# Patient Record
Sex: MALE | Race: WHITE | NOT HISPANIC OR LATINO | Employment: UNEMPLOYED | URBAN - METROPOLITAN AREA
[De-identification: names, ages, dates, MRNs, and addresses within clinical notes are randomized per-mention and may not be internally consistent; named-entity substitution may affect disease eponyms.]

---

## 2023-11-08 ENCOUNTER — OFFICE VISIT (OUTPATIENT)
Dept: NEUROLOGY | Facility: CLINIC | Age: 18
End: 2023-11-08
Payer: COMMERCIAL

## 2023-11-08 VITALS — HEART RATE: 77 BPM | SYSTOLIC BLOOD PRESSURE: 130 MMHG | DIASTOLIC BLOOD PRESSURE: 82 MMHG

## 2023-11-08 DIAGNOSIS — G40.309 GENERALIZED SEIZURE DISORDER (MULTI): Primary | ICD-10-CM

## 2023-11-08 PROCEDURE — 1036F TOBACCO NON-USER: CPT | Performed by: PSYCHIATRY & NEUROLOGY

## 2023-11-08 PROCEDURE — 99203 OFFICE O/P NEW LOW 30 MIN: CPT | Performed by: PSYCHIATRY & NEUROLOGY

## 2023-11-08 RX ORDER — LEVETIRACETAM 750 MG/1
750 TABLET ORAL 2 TIMES DAILY
COMMUNITY
Start: 2023-11-07 | End: 2023-11-14 | Stop reason: SDUPTHER

## 2023-11-08 ASSESSMENT — ENCOUNTER SYMPTOMS: SEIZURES: 1

## 2023-11-08 NOTE — PROGRESS NOTES
Subjective   Kev Mendoza is a 18 y.o.   male.  HPI  This young man with a history of seizures since January, 2023 was referred  By Dr. Patrick Banks, his PCP.  His wife and review of Dr. Banks's notes provides his HPI.  The patient cannot provide any medical history: according to his mother he has a speech delay and tic disorder.  There is no history of closed head injury or craniotomy, no additional neurological problem, such as seizures when growing up.  He has had a total of 4 convulsive seizures.  He has been on Keppra, the dosing increased to 750 mg twice a day.  Two Keppra levels have been <2,  The patient's mother states he has been compliant.  He has not had an EEG, CT images of the head, without contrast, at [facility] did not show any abnormality (3/24/23 and 4/12/23).    He has been diagnosed and treated for a PFO by Cardiology- and the PFO was closed 5/23.  FH: stroke in his maternal grandmother.    Objective   Neurological Exam  Limited neurological examination.  Patient is nearly mute, but able to follow commands.  Normal horizontal eye movements.  Pupils are mid-sized, round, and reactive to light.  VF are full.  Facial muscles are normal.  Moves all 4 extremities well.  Reflexes are 2/4 and symmetric.  Patient is not cooperative for taking off his shoes to perform plantar testing.  Gait is normal.  Normal finger to nose testing bilaterally.    Physical Exam  I personally reviewed laboratory, radiographic, and medical studies which were pertinent for no abnormalities.    Assessment/Plan

## 2023-11-08 NOTE — PATIENT INSTRUCTIONS
Your son has been doing well with control of the seizures.  Continue the current dosing of the Keppra and schedule the EEG: it can be done at [facility].  Follow up in 6 months.

## 2023-11-14 DIAGNOSIS — G40.309 GENERALIZED CONVULSIVE EPILEPSY (MULTI): Primary | ICD-10-CM

## 2023-11-14 RX ORDER — LEVETIRACETAM 750 MG/1
750 TABLET ORAL 2 TIMES DAILY
Qty: 60 TABLET | Refills: 5 | Status: SHIPPED | OUTPATIENT
Start: 2023-11-14 | End: 2023-12-14

## 2023-11-20 ENCOUNTER — TELEPHONE (OUTPATIENT)
Dept: NEUROLOGY | Facility: HOSPITAL | Age: 18
End: 2023-11-20

## 2023-12-05 DIAGNOSIS — G40.309 GENERALIZED CONVULSIVE EPILEPSY (MULTI): Primary | ICD-10-CM

## 2023-12-21 ENCOUNTER — APPOINTMENT (OUTPATIENT)
Dept: NEUROLOGY | Facility: HOSPITAL | Age: 18
End: 2023-12-21
Payer: COMMERCIAL

## 2024-02-07 DIAGNOSIS — G40.909 SEIZURE DISORDER (MULTI): Primary | ICD-10-CM

## 2024-02-28 ENCOUNTER — TELEPHONE (OUTPATIENT)
Dept: NEUROLOGY | Facility: CLINIC | Age: 19
End: 2024-02-28
Payer: COMMERCIAL

## 2024-02-28 ENCOUNTER — DOCUMENTATION (OUTPATIENT)
Dept: NEUROLOGY | Facility: CLINIC | Age: 19
End: 2024-02-28
Payer: COMMERCIAL

## 2024-03-01 DIAGNOSIS — G40.909 SEIZURE DISORDER (MULTI): Primary | ICD-10-CM

## 2024-03-01 RX ORDER — DIVALPROEX SODIUM 125 MG/1
125 TABLET, DELAYED RELEASE ORAL EVERY EVENING
Qty: 30 TABLET | Refills: 2 | Status: SHIPPED | OUTPATIENT
Start: 2024-03-01 | End: 2024-03-31

## 2024-05-07 ENCOUNTER — APPOINTMENT (OUTPATIENT)
Dept: NEUROLOGY | Facility: CLINIC | Age: 19
End: 2024-05-07
Payer: COMMERCIAL

## 2024-06-17 ENCOUNTER — APPOINTMENT (OUTPATIENT)
Dept: NEUROLOGY | Facility: CLINIC | Age: 19
End: 2024-06-17
Payer: COMMERCIAL

## 2024-06-17 VITALS
WEIGHT: 315 LBS | HEART RATE: 76 BPM | HEIGHT: 69 IN | SYSTOLIC BLOOD PRESSURE: 128 MMHG | DIASTOLIC BLOOD PRESSURE: 68 MMHG | BODY MASS INDEX: 46.65 KG/M2

## 2024-06-17 DIAGNOSIS — G40.909 SEIZURE DISORDER (MULTI): ICD-10-CM

## 2024-06-17 DIAGNOSIS — G40.309 GENERALIZED CONVULSIVE EPILEPSY (MULTI): ICD-10-CM

## 2024-06-17 PROCEDURE — 99213 OFFICE O/P EST LOW 20 MIN: CPT | Performed by: PSYCHIATRY & NEUROLOGY

## 2024-06-17 PROCEDURE — 1036F TOBACCO NON-USER: CPT | Performed by: PSYCHIATRY & NEUROLOGY

## 2024-06-17 RX ORDER — LEVETIRACETAM 750 MG/1
750 TABLET ORAL 2 TIMES DAILY
Qty: 60 TABLET | Refills: 5 | Status: SHIPPED | OUTPATIENT
Start: 2024-06-17 | End: 2024-07-17

## 2024-06-17 RX ORDER — GLUCOSAM/CHONDRO/HERB 149/HYAL 750-100 MG
1 TABLET ORAL 2 TIMES DAILY
COMMUNITY

## 2024-06-17 RX ORDER — DIVALPROEX SODIUM 250 MG/1
250 TABLET, DELAYED RELEASE ORAL 2 TIMES DAILY
Qty: 60 TABLET | Refills: 5 | Status: SHIPPED | OUTPATIENT
Start: 2024-06-17 | End: 2024-07-17

## 2024-06-17 NOTE — PROGRESS NOTES
Subjective   Kev Mendoza is a 18 y.o.   male.  HPI  This is a 18 year old man with a history of seizures, since 1/2023.  He was last seen 11/2023.  He last had a seizure about 2 months ago- his mother states he had trouble sleeping.  Generic Depakote 125 mg  at bedtime.  Normal EEG in 2/24, [facility].  He is here with his mother.    Objective   Neurological Exam  Alert and pleasant young man.  Physical Exam  I personally reviewed laboratory, radiographic, and medical studies which were pertinent for nothing.    Assessment/Plan

## 2024-07-19 ENCOUNTER — TELEPHONE (OUTPATIENT)
Dept: NEUROLOGY | Facility: CLINIC | Age: 19
End: 2024-07-19
Payer: COMMERCIAL

## 2024-07-19 DIAGNOSIS — R41.89 COGNITIVE IMPAIRMENT: Primary | ICD-10-CM

## 2024-07-19 NOTE — TELEPHONE ENCOUNTER
Pt needs refill of omega 3-dha-epa-fish oil 1,000 mg (120 mg-180 mg) capsule, Take 1 capsule (1,000 mg) po BID sent to [facility] [place].

## 2024-07-20 RX ORDER — GLUCOSAM/CHONDRO/HERB 149/HYAL 750-100 MG
1 TABLET ORAL 2 TIMES DAILY
Qty: 60 CAPSULE | Refills: 5 | Status: SHIPPED | OUTPATIENT
Start: 2024-07-20

## 2024-11-18 ENCOUNTER — APPOINTMENT (OUTPATIENT)
Dept: NEUROLOGY | Facility: CLINIC | Age: 19
End: 2024-11-18
Payer: COMMERCIAL

## 2025-07-14 ENCOUNTER — TELEPHONE (OUTPATIENT)
Dept: NEUROLOGY | Facility: CLINIC | Age: 20
End: 2025-07-14
Payer: COMMERCIAL

## 2025-07-14 DIAGNOSIS — G40.309 GENERALIZED CONVULSIVE EPILEPSY (MULTI): ICD-10-CM

## 2025-07-14 RX ORDER — LEVETIRACETAM 750 MG/1
750 TABLET ORAL 2 TIMES DAILY
Qty: 60 TABLET | Refills: 5 | Status: SHIPPED | OUTPATIENT
Start: 2025-07-14

## 2025-07-14 NOTE — TELEPHONE ENCOUNTER
Pt needs script for levetiracetam 750 mg tablet, Take 1 tablet po BID sent to [facility] [place]    Last seen:  6/17/2024  Next appt: 7/16/2025

## 2025-08-05 ENCOUNTER — TELEPHONE (OUTPATIENT)
Dept: NEUROLOGY | Facility: CLINIC | Age: 20
End: 2025-08-05
Payer: COMMERCIAL

## 2025-08-05 DIAGNOSIS — R41.89 COGNITIVE IMPAIRMENT: ICD-10-CM

## 2025-08-05 RX ORDER — GLUCOSAM/CHONDRO/HERB 149/HYAL 750-100 MG
1 TABLET ORAL 2 TIMES DAILY
Qty: 60 CAPSULE | Refills: 5 | Status: SHIPPED | OUTPATIENT
Start: 2025-08-05

## 2025-08-05 NOTE — TELEPHONE ENCOUNTER
Jahaira, I gave you a refill request for omega 3-dha-epa-fish oil for this pt on Friday but do not see where he has been contacted or scheduled for an appt (?)